# Patient Record
Sex: FEMALE | Race: BLACK OR AFRICAN AMERICAN
[De-identification: names, ages, dates, MRNs, and addresses within clinical notes are randomized per-mention and may not be internally consistent; named-entity substitution may affect disease eponyms.]

---

## 2019-08-16 ENCOUNTER — HOSPITAL ENCOUNTER (EMERGENCY)
Dept: HOSPITAL 72 - EMR | Age: 19
Discharge: HOME | End: 2019-08-16
Payer: MEDICAID

## 2019-08-16 VITALS — SYSTOLIC BLOOD PRESSURE: 96 MMHG | DIASTOLIC BLOOD PRESSURE: 58 MMHG

## 2019-08-16 VITALS — HEIGHT: 65 IN | BODY MASS INDEX: 19.99 KG/M2 | WEIGHT: 120 LBS

## 2019-08-16 VITALS — DIASTOLIC BLOOD PRESSURE: 58 MMHG | SYSTOLIC BLOOD PRESSURE: 96 MMHG

## 2019-08-16 DIAGNOSIS — L50.0: Primary | ICD-10-CM

## 2019-08-16 PROCEDURE — 99283 EMERGENCY DEPT VISIT LOW MDM: CPT

## 2019-08-16 NOTE — NUR
ED Nurse Note:



Patient walked in to ER due to rush all over upper body. AAO x4, VSS at this 
time, skin is dry warm to touch.

## 2019-08-16 NOTE — EMERGENCY ROOM REPORT
History of Present Illness


General


Chief Complaint:  Skin Rash/Abscess


Source:  Patient





Present Illness


HPI


18-year-old female with no significant past medical history here complaining of 

1 day of pruritic rash all over her body.  Patient reports that every time that 

she goes to her friend's house she started having allergic reaction however 

denies any anaphylaxis at this time.  Denies pain at the site of rash. 

urticarial rash noted all over body.  Denies fever and chills, abdominal pain, 

nausea vomiting.  Denies insect bite.  Denies chest pain, shortness of breath, 

palpitation, and all other associated symptoms.  Has not taken any medication 

for symptom relief..


Allergies:  


Coded Allergies:  


     No Known Allergies (Unverified , 3/18/13)





Patient History


Past Medical History:  see triage record


Past Surgical History:  unable to obtain


Pertinent Family History:  none


Last Menstrual Period:  08/10/19


Pregnant Now:  No


:  0


Para:  0


Immunizations:  UTD


Reviewed Nursing Documentation:  PMH: Agreed; PSxH: Agreed





Nursing Documentation-PMH


Past Medical History:  No Stated History


Hx Cardiac Problems:  No


Hx Neurological Problems:  No





Review of Systems


All Other Systems:  negative except mentioned in HPI





Physical Exam





Vital Signs








  Date Time  Temp Pulse Resp B/P (MAP) Pulse Ox O2 Delivery O2 Flow Rate FiO2


 


19 19:59 98.4 70 17 96/58 (71) 100 Room Air  








Sp02 EP Interpretation:  reviewed, normal


General Appearance:  no apparent distress, alert, GCS 15, non-toxic


Head:  normocephalic, atraumatic


ENT:  hearing grossly normal, normal pharynx, no angioedema, normal voice


Neck:  full range of motion, supple/symm/no masses


Respiratory:  chest non-tender, lungs clear, normal breath sounds, speaking 

full sentences


Cardiovascular #1:  regular rate, rhythm, no edema


Gastrointestinal:  normal bowel sounds, non tender, soft, non-distended, no 

guarding, no rebound


Musculoskeletal:  back normal, gait/station normal, normal range of motion, non-

tender


Neurologic:  alert, oriented x3, responsive, motor strength/tone normal, 

sensory intact, speech normal


Psychiatric:  judgement/insight normal, memory normal, mood/affect normal, no 

suicidal/homicidal ideation


Skin:  other - uritacarial rash all over body


Lymphatic:  no adenopathy





Medical Decision Making


PA Attestation


All my diagnosis and treatment plans were reviewed ad discussed with my 

supervising physician Dr. Villareal


Diagnostic Impression:  


 Primary Impression:  


 Allergic urticaria


ER Course


18-year-old female with no significant past medical history here complaining of 

1 day of pruritic rash all over her body.  Patient reports that every time that 

she goes to her friend's house she started having allergic reaction however 

denies any anaphylaxis at this time.  Denies pain at the site of rash. 

urticarial rash noted all over body.  Denies fever and chills, abdominal pain, 

nausea vomiting.  Denies insect bite.  Denies chest pain, shortness of breath, 

palpitation, and all other associated symptoms.  Has not taken any medication 

for symptom relief..





Ddx considered but are not limited to: Eczema, scabies, lice,allergic urticaria





Vital signs: are WNL, pt. is afebrile





H&PE are most consistent with: Allergic urticaria





ORDERS: Prednisone, hydrocortisone cream, Benadryl


ED INTERVENTIONS: Prednisone and Benadryl








DISCHARGE: At this time pt. is stable for d/c to home. Will provide printed 

patient care instructions, and any necessary prescriptions. Care plan and 

follow up instructions have been discussed with the patient prior to discharge.

  Take medication as directed follow-up with a primary care provider worsening 

symptoms of anaphylaxis return to the emergency room





Last Vital Signs








  Date Time  Temp Pulse Resp B/P (MAP) Pulse Ox O2 Delivery O2 Flow Rate FiO2


 


19 19:59 98.4 70 17 96/58 (71) 100 Room Air  








Disposition:  HOME, SELF-CARE


Condition:  Stable


Scripts


Diphenhydramine HCl (Benadryl) 25 Mg Capsule


25 MG PO TID, #30 CAP


   Prov: Zuleima Mehta         19 


Hydrocortisone/Aloe Vera 1%* (HYDROCORTISONE-ALOE 1% CREAM*) Y Cr


1 APPLIC TOPIC Q6H PRN for Itching, #30 GM


   Prov: Zuleima Mehta         19 


Prednisone* (PREDNISONE*) 20 Mg Tablet


20 MG ORAL BID for 5 Days, #10 TAB 0 Refills


   Prov: Zuleima Mehta         19


Patient Instructions:  Allergies, Easy-to-Read, Contact Dermatitis, Easy-to-Read





Additional Instructions:  


Take medication as directed if difficulty breathing return to the emergency 

room follow-up with your primary care provider











Zuleima Mehta Aug 16, 2019 20:33